# Patient Record
Sex: FEMALE | Race: BLACK OR AFRICAN AMERICAN | NOT HISPANIC OR LATINO | URBAN - METROPOLITAN AREA
[De-identification: names, ages, dates, MRNs, and addresses within clinical notes are randomized per-mention and may not be internally consistent; named-entity substitution may affect disease eponyms.]

---

## 2024-05-19 ENCOUNTER — OFFICE VISIT (OUTPATIENT)
Dept: URGENT CARE | Facility: CLINIC | Age: 19
End: 2024-05-19
Payer: COMMERCIAL

## 2024-05-19 VITALS
WEIGHT: 145 LBS | OXYGEN SATURATION: 95 % | DIASTOLIC BLOOD PRESSURE: 76 MMHG | HEIGHT: 67 IN | SYSTOLIC BLOOD PRESSURE: 114 MMHG | BODY MASS INDEX: 22.76 KG/M2 | TEMPERATURE: 98 F | HEART RATE: 108 BPM | RESPIRATION RATE: 18 BRPM

## 2024-05-19 DIAGNOSIS — J02.9 EXUDATIVE PHARYNGITIS: Primary | ICD-10-CM

## 2024-05-19 LAB
CTP QC/QA: YES
CTP QC/QA: YES
HETEROPH AB SER QL: NEGATIVE
MOLECULAR STREP A: NEGATIVE

## 2024-05-19 PROCEDURE — 99203 OFFICE O/P NEW LOW 30 MIN: CPT | Mod: S$GLB,,, | Performed by: NURSE PRACTITIONER

## 2024-05-19 PROCEDURE — 87070 CULTURE OTHR SPECIMN AEROBIC: CPT | Performed by: NURSE PRACTITIONER

## 2024-05-19 PROCEDURE — 87591 N.GONORRHOEAE DNA AMP PROB: CPT | Performed by: NURSE PRACTITIONER

## 2024-05-19 PROCEDURE — 86308 HETEROPHILE ANTIBODY SCREEN: CPT | Mod: QW,S$GLB,, | Performed by: NURSE PRACTITIONER

## 2024-05-19 PROCEDURE — 87651 STREP A DNA AMP PROBE: CPT | Mod: QW,S$GLB,, | Performed by: NURSE PRACTITIONER

## 2024-05-19 PROCEDURE — 87077 CULTURE AEROBIC IDENTIFY: CPT | Performed by: NURSE PRACTITIONER

## 2024-05-19 NOTE — PROGRESS NOTES
"Subjective:      Patient ID: aGry Olvera is a 18 y.o. female.    Vitals:  height is 5' 6.5" (1.689 m) and weight is 65.8 kg (145 lb). Her oral temperature is 98.4 °F (36.9 °C). Her blood pressure is 114/76 and her pulse is 108. Her respiration is 18 and oxygen saturation is 95%.     Chief Complaint: Sore Throat    Pt states she noticed white patches in her throat 2 days ago. States she doesn't even have a sore throat nor does she have any other sx.  Provider note begins below    Patient denies sore throat, fever, headaches, or body aches.  No known ill contacts.  He is concerned about bacteria or GC.  Would like to be tested before she gets treated.  She has a history of tonsillar stones with these do not look like tonsillar stones.  Denies fatigue.  States she has had mono in the past she believes, she had positive antibodies.    Sore Throat   This is a new problem. Episode onset: 2 days. The problem has been unchanged. There has been no fever. The pain is at a severity of 0/10. The patient is experiencing no pain. Pertinent negatives include no coughing, diarrhea, shortness of breath, trouble swallowing or vomiting.       Constitution: Negative for chills, sweating, fatigue and fever.   HENT:  Negative for sore throat, trouble swallowing and voice change.    Cardiovascular:  Negative for chest pain and sob on exertion.   Respiratory:  Negative for cough and shortness of breath.    Gastrointestinal:  Negative for nausea, vomiting, constipation and diarrhea.   Musculoskeletal:  Negative for pain.      Objective:     Physical Exam   Constitutional: She is oriented to person, place, and time.   HENT:   Head: Normocephalic and atraumatic.   Mouth/Throat: Oropharyngeal exudate present. No posterior oropharyngeal erythema.   Neck: No neck rigidity present.   Cardiovascular: Normal rate and regular rhythm.   Pulmonary/Chest: Effort normal and breath sounds normal. No stridor. No respiratory distress. She has no wheezes. " She has no rhonchi. She has no rales. She exhibits no tenderness.   Abdominal: Normal appearance.   Lymphadenopathy:     She has no cervical adenopathy.   Neurological: She is alert and oriented to person, place, and time.   Skin: Skin is warm and dry.   Psychiatric: Her behavior is normal. Mood normal.     Results for orders placed or performed in visit on 05/19/24   POCT Strep A, Molecular   Result Value Ref Range    Molecular Strep A, POC Negative Negative     Acceptable Yes    POCT Infectious mononucleosis antibody   Result Value Ref Range    Monospot Negative Negative     Acceptable Yes         Assessment:     1. Exudative pharyngitis        Plan:   Strep test negative   Throat culture   Mono test negative  GC culture   Magic mouthwash   Due at share any utensils food or saliva with anyone   Wishes to be tested before she is treated   Saltwater gargles   Follow-up with ENT if not improving             Exudative pharyngitis  -     POCT Strep A, Molecular  -     POCT Infectious mononucleosis antibody  -     Culture, Throat  -     C.trach/N.gonor AMP RNA  -     (Magic mouthwash) 1:1:1 diphenhydrAMINE(Benadryl) 12.5mg/5ml liq, aluminum & magnesium hydroxide-simethicone (Maalox), LIDOcaine viscous 2%; Swish and spit 10 mLs every 4 (four) hours as needed (sore throat).  Dispense: 200 mL; Refill: 0

## 2024-05-21 ENCOUNTER — TELEPHONE (OUTPATIENT)
Dept: URGENT CARE | Facility: CLINIC | Age: 19
End: 2024-05-21
Payer: COMMERCIAL

## 2024-05-21 LAB
C TRACH RRNA SPEC QL NAA+PROBE: NEGATIVE
N GONORRHOEA RRNA SPEC QL NAA+PROBE: NEGATIVE
N.GONORROHEAE, AMP RNA SOURCE: NORMAL
SPECIMEN SOURCE: NORMAL

## 2024-05-21 RX ORDER — AMOXICILLIN AND CLAVULANATE POTASSIUM 875; 125 MG/1; MG/1
1 TABLET, FILM COATED ORAL 2 TIMES DAILY
Qty: 20 TABLET | Refills: 0 | Status: SHIPPED | OUTPATIENT
Start: 2024-05-21 | End: 2024-05-31

## 2024-05-21 NOTE — TELEPHONE ENCOUNTER
Called For follow-up and throat culture positive for H influenza.  As discussed with patient, I called Augmentin to her pharmacy.  Throat precautions advised.  Advised to follow-up with PCP. Answered all questions.

## 2024-05-23 LAB — BACTERIA THROAT CULT: ABNORMAL

## 2024-05-24 ENCOUNTER — TELEPHONE (OUTPATIENT)
Dept: URGENT CARE | Facility: CLINIC | Age: 19
End: 2024-05-24
Payer: COMMERCIAL

## 2024-05-24 NOTE — TELEPHONE ENCOUNTER
Patient was notified on throat culture , stated  that she still taking medications and doing fine thank you for informing her of this information.   Nataliia Blanco
